# Patient Record
Sex: FEMALE | Race: OTHER | NOT HISPANIC OR LATINO
[De-identification: names, ages, dates, MRNs, and addresses within clinical notes are randomized per-mention and may not be internally consistent; named-entity substitution may affect disease eponyms.]

---

## 2024-02-13 ENCOUNTER — NON-APPOINTMENT (OUTPATIENT)
Age: 64
End: 2024-02-13

## 2024-02-27 ENCOUNTER — NON-APPOINTMENT (OUTPATIENT)
Age: 64
End: 2024-02-27

## 2024-02-27 ENCOUNTER — APPOINTMENT (OUTPATIENT)
Dept: OTOLARYNGOLOGY | Facility: CLINIC | Age: 64
End: 2024-02-27
Payer: COMMERCIAL

## 2024-02-27 VITALS
TEMPERATURE: 97.8 F | SYSTOLIC BLOOD PRESSURE: 137 MMHG | BODY MASS INDEX: 23.54 KG/M2 | HEART RATE: 72 BPM | DIASTOLIC BLOOD PRESSURE: 89 MMHG | OXYGEN SATURATION: 97 % | WEIGHT: 150 LBS | HEIGHT: 67 IN

## 2024-02-27 DIAGNOSIS — G51.0 BELL'S PALSY: ICD-10-CM

## 2024-02-27 DIAGNOSIS — H81.11 BENIGN PAROXYSMAL VERTIGO, RIGHT EAR: ICD-10-CM

## 2024-02-27 DIAGNOSIS — Z78.9 OTHER SPECIFIED HEALTH STATUS: ICD-10-CM

## 2024-02-27 DIAGNOSIS — H61.23 IMPACTED CERUMEN, BILATERAL: ICD-10-CM

## 2024-02-27 DIAGNOSIS — H91.92 UNSPECIFIED HEARING LOSS, LEFT EAR: ICD-10-CM

## 2024-02-27 PROBLEM — Z00.00 ENCOUNTER FOR PREVENTIVE HEALTH EXAMINATION: Status: ACTIVE | Noted: 2024-02-27

## 2024-02-27 PROCEDURE — G0268 REMOVAL OF IMPACTED WAX MD: CPT

## 2024-02-27 PROCEDURE — 95992 CANALITH REPOSITIONING PROC: CPT

## 2024-02-27 PROCEDURE — 99203 OFFICE O/P NEW LOW 30 MIN: CPT | Mod: 25

## 2024-02-27 PROCEDURE — 92557 COMPREHENSIVE HEARING TEST: CPT

## 2024-02-27 PROCEDURE — 92550 TYMPANOMETRY & REFLEX THRESH: CPT | Mod: 52

## 2024-02-27 NOTE — PHYSICAL EXAM
[Binocular Microscopic Exam] : Binocular microscopic exam was performed [Normal] : no masses and lesions seen, face is symmetric [FreeTextEntry8] : Obstructing cerumen was removed with a hook [FreeTextEntry9] : Large cerumen plug was removed w/ a hook [de-identified] : The [place] Hallpike was positive for R posterior canal BPPV. An Epley maneuver was then performed x2 with relief on the second pass.

## 2024-02-27 NOTE — ASSESSMENT
[FreeTextEntry1] : Discussed post-epley maneuver care and provided the corresponding handout. RTC further vertigo.  RTC for ongoing complaints

## 2024-02-27 NOTE — HISTORY OF PRESENT ILLNESS
[de-identified] : Beginning 1/10/24 she developed spinning vertigo when she looks up with her eyes (not her head); this is no longer spinning but is still uncomfortable. This doesn't occur if she covers her left eye. She saw an  who gave her eyedrops and otherwise cleared her.  In 1985 she had a Bell's palsy and since then she's had period "clogging" of the left ear when she eats dairy; administering an enema helps this. Periodic very short periods of nonpulsatile tinnitus.  Has some sort of thyroid condition but isn't sure what- she doesn't take meds for this. She is followed by an outside endo.

## 2024-08-19 ENCOUNTER — APPOINTMENT (OUTPATIENT)
Dept: MAMMOGRAPHY | Facility: CLINIC | Age: 64
End: 2024-08-19
Payer: COMMERCIAL

## 2024-08-19 PROCEDURE — 77067 SCR MAMMO BI INCL CAD: CPT

## 2024-08-19 PROCEDURE — 77063 BREAST TOMOSYNTHESIS BI: CPT
